# Patient Record
Sex: FEMALE | Race: WHITE
[De-identification: names, ages, dates, MRNs, and addresses within clinical notes are randomized per-mention and may not be internally consistent; named-entity substitution may affect disease eponyms.]

---

## 2020-09-14 ENCOUNTER — HOSPITAL ENCOUNTER (EMERGENCY)
Dept: HOSPITAL 12 - ER | Age: 30
Discharge: HOME | End: 2020-09-14
Payer: MEDICAID

## 2020-09-14 VITALS — SYSTOLIC BLOOD PRESSURE: 101 MMHG | DIASTOLIC BLOOD PRESSURE: 58 MMHG

## 2020-09-14 VITALS — HEIGHT: 66 IN | BODY MASS INDEX: 22.02 KG/M2 | WEIGHT: 137 LBS

## 2020-09-14 DIAGNOSIS — R00.2: Primary | ICD-10-CM

## 2020-09-14 LAB
ALP SERPL-CCNC: 30 U/L (ref 50–136)
ALT SERPL W/O P-5'-P-CCNC: 19 U/L (ref 14–59)
AST SERPL-CCNC: 15 U/L (ref 15–37)
BASOPHILS # BLD AUTO: 0 K/UL (ref 0–8)
BASOPHILS NFR BLD AUTO: 0.3 % (ref 0–2)
BILIRUB DIRECT SERPL-MCNC: 0.2 MG/DL (ref 0–0.2)
BILIRUB SERPL-MCNC: 0.6 MG/DL (ref 0.2–1)
BUN SERPL-MCNC: 12 MG/DL (ref 7–18)
CHLORIDE SERPL-SCNC: 103 MMOL/L (ref 98–107)
CO2 SERPL-SCNC: 30 MMOL/L (ref 21–32)
CREAT SERPL-MCNC: 0.6 MG/DL (ref 0.6–1.3)
EOSINOPHIL # BLD AUTO: 0.1 K/UL (ref 0–0.7)
EOSINOPHIL NFR BLD AUTO: 0.8 % (ref 0–7)
GLUCOSE SERPL-MCNC: 119 MG/DL (ref 74–106)
HCT VFR BLD AUTO: 40 % (ref 31.2–41.9)
HGB BLD-MCNC: 13.4 G/DL (ref 10.9–14.3)
LYMPHOCYTES # BLD AUTO: 1.6 K/UL (ref 20–40)
LYMPHOCYTES NFR BLD AUTO: 24.4 % (ref 20.5–51.5)
MCH RBC QN AUTO: 29.1 UUG (ref 24.7–32.8)
MCHC RBC AUTO-ENTMCNC: 33 G/DL (ref 32.3–35.6)
MCV RBC AUTO: 87 FL (ref 75.5–95.3)
MONOCYTES # BLD AUTO: 0.3 K/UL (ref 2–10)
MONOCYTES NFR BLD AUTO: 5 % (ref 0–11)
NEUTROPHILS # BLD AUTO: 4.6 K/UL (ref 1.8–8.9)
NEUTROPHILS NFR BLD AUTO: 69.5 % (ref 38.5–71.5)
PLATELET # BLD AUTO: 252 K/UL (ref 179–408)
POTASSIUM SERPL-SCNC: 4.2 MMOL/L (ref 3.5–5.1)
RBC # BLD AUTO: 4.59 MIL/UL (ref 3.63–4.92)
WBC # BLD AUTO: 6.7 K/UL (ref 3.8–11.8)
WS STN SPEC: 7.4 G/DL (ref 6.4–8.2)

## 2020-09-14 PROCEDURE — A4663 DIALYSIS BLOOD PRESSURE CUFF: HCPCS

## 2020-09-14 NOTE — NUR
Patient presents to ER with c/o of feeling like "my heart is going to come out 
of my chest." and "I am having trouble breathing." and c/o of dizziness. Pt 02 
sat noted at 98% room air. Patient denies pain. No acute distress. Awaiting MD peters.

## 2020-09-14 NOTE — NUR
Per Dr. Alejandre, pt stable for discharge. DC instructions given and copy of labs 
and radiology report. Patient left ER in stable condition, in no acute distress 
with steady gait.

## 2020-11-23 ENCOUNTER — HOSPITAL ENCOUNTER (EMERGENCY)
Dept: HOSPITAL 54 - ER | Age: 30
LOS: 1 days | Discharge: HOME | End: 2020-11-24
Payer: MEDICAID

## 2020-11-23 VITALS — HEIGHT: 63 IN | WEIGHT: 138 LBS | BODY MASS INDEX: 24.45 KG/M2

## 2020-11-23 DIAGNOSIS — M54.9: ICD-10-CM

## 2020-11-23 DIAGNOSIS — U07.1: Primary | ICD-10-CM

## 2020-11-23 DIAGNOSIS — R51.9: ICD-10-CM

## 2020-11-23 PROCEDURE — 99283 EMERGENCY DEPT VISIT LOW MDM: CPT

## 2020-11-23 PROCEDURE — C9803 HOPD COVID-19 SPEC COLLECT: HCPCS

## 2020-11-23 PROCEDURE — 87426 SARSCOV CORONAVIRUS AG IA: CPT

## 2020-11-23 NOTE — NUR
PT BIBSELF C/O HEADACHE. PT ALSO C/O BACK PAIN WITH INSPIRATION AND OCCASIONAL 
SOB. PT CURRENTLY WAITING FOR COVID RESULTS AT THIS TIME. PT AAOX4. AMBUALTORY 
WITH STEADY GAIT. VITAL SIGNS STABLE. NO ACUTE DISTRESS NOTED AT THIS TIME. 
WILL CONTINUE TO MONITOR

## 2020-11-24 VITALS — DIASTOLIC BLOOD PRESSURE: 79 MMHG | SYSTOLIC BLOOD PRESSURE: 118 MMHG

## 2022-07-23 ENCOUNTER — HOSPITAL ENCOUNTER (EMERGENCY)
Dept: HOSPITAL 54 - ER | Age: 32
Discharge: HOME | End: 2022-07-23
Payer: MEDICAID

## 2022-07-23 VITALS — HEIGHT: 62 IN | BODY MASS INDEX: 25.76 KG/M2 | WEIGHT: 140 LBS

## 2022-07-23 VITALS — DIASTOLIC BLOOD PRESSURE: 68 MMHG | SYSTOLIC BLOOD PRESSURE: 110 MMHG

## 2022-07-23 DIAGNOSIS — Z3A.00: ICD-10-CM

## 2022-07-23 DIAGNOSIS — O26.899: Primary | ICD-10-CM

## 2022-07-23 DIAGNOSIS — Z79.899: ICD-10-CM

## 2022-07-23 DIAGNOSIS — H60.91: ICD-10-CM
